# Patient Record
Sex: FEMALE | Race: WHITE
[De-identification: names, ages, dates, MRNs, and addresses within clinical notes are randomized per-mention and may not be internally consistent; named-entity substitution may affect disease eponyms.]

---

## 2019-08-24 ENCOUNTER — HOSPITAL ENCOUNTER (EMERGENCY)
Dept: HOSPITAL 76 - ED | Age: 34
Discharge: HOME | End: 2019-08-24
Payer: COMMERCIAL

## 2019-08-24 VITALS — SYSTOLIC BLOOD PRESSURE: 133 MMHG | DIASTOLIC BLOOD PRESSURE: 88 MMHG

## 2019-08-24 DIAGNOSIS — K04.7: Primary | ICD-10-CM

## 2019-08-24 PROCEDURE — 99283 EMERGENCY DEPT VISIT LOW MDM: CPT

## 2019-08-24 PROCEDURE — 87205 SMEAR GRAM STAIN: CPT

## 2019-08-24 PROCEDURE — 87070 CULTURE OTHR SPECIMN AEROBIC: CPT

## 2019-08-24 PROCEDURE — 41800 DRAINAGE OF GUM LESION: CPT

## 2019-08-24 PROCEDURE — 99284 EMERGENCY DEPT VISIT MOD MDM: CPT

## 2019-08-24 NOTE — ED PHYSICIAN DOCUMENTATION
PD HPI HEENT





- Stated complaint


Stated Complaint: FACE SWELLING/TOOTH PX





- Chief complaint


Chief Complaint: Heent





- History obtained from


History obtained from: Patient





- History of Present Illness


Timing - onset: How many days ago (3)


Timing - duration: Days (3)


Timing - details: Gradual onset, Still present


Location: Tooth


Improves: Medication


Associated symptoms: Fever, Facial swelling


Similar symptoms before: Diagnosis (dental abscess)


Recently seen: Not recently seen





- Additional information


Additional information: 





Previously well 33-year-old female has developed some pain in a right lower 

tooth and she is now developed swelling of her face this morning.  She was going

to wait to see the dentist until Monday and she is decided to come in today 

because of the significant swelling that developed this morning.  She has 

significant pain associated with this as well.  She has not had drainage from 

the area.





Review of Systems


Constitutional: reports: Fever


Eyes: denies: Decreased vision


Ears: denies: Ear pain


Nose: denies: Congestion


Throat: reports: Dental pain / toothache


Respiratory: denies: Dyspnea, Cough





PD PAST MEDICAL HISTORY





- Past Medical History


Past Medical History: No





- Past Surgical History


Past Surgical History: Yes


General: Appendectomy


HEENT: Other





- Present Medications


Home Medications: 


                                Ambulatory Orders











 Medication  Instructions  Recorded  Confirmed


 


Amox/Clav 875/125 [Augmentin] 1 each PO Q12H #14 tablet 08/24/19 


 


Hydrocodone/Acetaminophen 1 - 2 each PO Q6H PRN #14 tablet 08/24/19 





[Hydrocodon-Acetaminophen 5-325]   














- Allergies


Allergies/Adverse Reactions: 


                                    Allergies











Allergy/AdvReac Type Severity Reaction Status Date / Time


 


No Known Drug Allergies Allergy   Verified 08/24/19 21:47














- Social History


Does the pt smoke?: No


Smoking Status: Never smoker


Does the pt drink ETOH?: No


Does the pt have substance abuse?: No





- Immunizations


Immunizations are current?: Yes





PD ED PE NORMAL





- Vitals


Vital signs reviewed: Yes





- General


General: Alert and oriented X 3, Well developed/nourished, Other (There is 

obvious swelling to the right face and the patient appears to be in pain. )





- HEENT


HEENT: Atraumatic, PERRL, EOMI, Other (There is swelling and fluctuance to the 

right lower buccal fold. There are missing and crooked teeth. There is swelling 

to the lower jaw on the right side. )





- Neck


Neck: Supple, no meningeal sign, No bony TTP





- Respiratory


Respiratory: No respiratory distress





- Derm


Derm: Normal color, Warm and dry, No rash





- Extremities


Extremities: No deformity, No edema





- Neuro


Neuro: Alert and oriented X 3, CNs 2-12 intact, No motor deficit, No sensory 

deficit, Normal speech


Eye Opening: Spontaneous


Motor: Obeys Commands


Verbal: Oriented


GCS Score: 15





- Psych


Psych: Normal mood, Other (affect is flat and painful )





Results





- Vitals


Vitals: 





                               Vital Signs - 24 hr











  08/24/19





  21:46


 


Temperature 36.4 C L


 


Heart Rate 113 H


 


Respiratory 18





Rate 


 


Blood Pressure 133/88 H


 


O2 Saturation 98














Procedures





- Abscess I&D (location)


  ** right lower jaw


Preparation: Lidocaine 2 % (jelly)


Incision: Needle aspiration, Purulent drainage, Culture obtained


Other: Pt tolerated well, Antibiotic prescribed





PD MEDICAL DECISION MAKING





- ED course


Complexity details: considered differential, d/w patient


ED course: 





31-year-old female with a dental abscess and fluctuance to the buccal fold is 

administered 2% lidocaine jelly on cotton roll and needle aspiration is done 

obtaining several cc of purulent foul-smelling drainage.  The patient is 

encouraged to massage the area and get whatever fluid else she can out of there.

 We will place her on some Augmentin and pain medication.





Departure





- Departure


Disposition: 01 Home, Self Care


Clinical Impression: 


 Dental abscess





Condition: Stable


Instructions:  ED Abscess Dental


Follow-Up: 


[facility] [place] [Provider Group]


Prescriptions: 


Amox/Clav 875/125 [Augmentin] 1 each PO Q12H #14 tablet


Hydrocodone/Acetaminophen [Hydrocodon-Acetaminophen 5-325] 1 - 2 each PO Q6H PRN

#14 tablet


 PRN Reason: pain